# Patient Record
Sex: MALE | Race: WHITE | Employment: OTHER | URBAN - METROPOLITAN AREA
[De-identification: names, ages, dates, MRNs, and addresses within clinical notes are randomized per-mention and may not be internally consistent; named-entity substitution may affect disease eponyms.]

---

## 2018-12-06 ENCOUNTER — HOSPITAL ENCOUNTER (OUTPATIENT)
Dept: ULTRASOUND IMAGING | Age: 73
Discharge: HOME | End: 2018-12-06
Payer: MEDICARE

## 2018-12-06 DIAGNOSIS — R10.11 RUQ PAIN: ICD-10-CM

## 2018-12-06 PROCEDURE — 76705 ECHO EXAM OF ABDOMEN: CPT

## 2018-12-10 ENCOUNTER — HOSPITAL ENCOUNTER (OUTPATIENT)
Dept: NUCLEAR MEDICINE | Age: 73
Discharge: HOME | End: 2018-12-10
Payer: MEDICARE

## 2018-12-10 DIAGNOSIS — R10.11 RIGHT UPPER QUADRANT PAIN: ICD-10-CM

## 2018-12-10 PROCEDURE — 3430000000 HC RX DIAGNOSTIC RADIOPHARMACEUTICAL: Performed by: INTERNAL MEDICINE

## 2018-12-10 PROCEDURE — 78227 HEPATOBIL SYST IMAGE W/DRUG: CPT

## 2018-12-10 PROCEDURE — A9537 TC99M MEBROFENIN: HCPCS | Performed by: INTERNAL MEDICINE

## 2018-12-10 RX ADMIN — MEBROFENIN 6 MILLICURIE: 45 INJECTION, POWDER, LYOPHILIZED, FOR SOLUTION INTRAVENOUS at 11:54

## 2019-10-24 ENCOUNTER — HOSPITAL ENCOUNTER (OUTPATIENT)
Dept: CT IMAGING | Age: 74
Discharge: HOME | End: 2019-10-24
Payer: MEDICARE

## 2019-10-24 DIAGNOSIS — S06.0X9A CONCUSSION WITH MODERATE (1-24 HOURS) LOSS OF CONSCIOUSNESS: ICD-10-CM

## 2019-10-24 PROCEDURE — 70450 CT HEAD/BRAIN W/O DYE: CPT

## 2020-10-27 ENCOUNTER — OFFICE VISIT (OUTPATIENT)
Dept: PRIMARY CARE CLINIC | Age: 75
End: 2020-10-27
Payer: MEDICARE

## 2020-10-27 PROCEDURE — 99211 OFF/OP EST MAY X REQ PHY/QHP: CPT | Performed by: NURSE PRACTITIONER

## 2020-10-28 LAB — SARS-COV-2, NAA: NOT DETECTED

## 2020-10-29 ENCOUNTER — TELEPHONE (OUTPATIENT)
Dept: ADMINISTRATIVE | Age: 75
End: 2020-10-29

## 2020-10-29 NOTE — RESULT ENCOUNTER NOTE
